# Patient Record
Sex: FEMALE | Race: WHITE | NOT HISPANIC OR LATINO | URBAN - METROPOLITAN AREA
[De-identification: names, ages, dates, MRNs, and addresses within clinical notes are randomized per-mention and may not be internally consistent; named-entity substitution may affect disease eponyms.]

---

## 2022-01-19 ENCOUNTER — INPATIENT (INPATIENT)
Facility: HOSPITAL | Age: 77
LOS: 1 days | Discharge: HOME | DRG: 563 | End: 2022-01-21
Attending: FAMILY MEDICINE | Admitting: HOSPITALIST
Payer: MEDICARE

## 2022-01-19 VITALS
SYSTOLIC BLOOD PRESSURE: 144 MMHG | RESPIRATION RATE: 18 BRPM | DIASTOLIC BLOOD PRESSURE: 69 MMHG | OXYGEN SATURATION: 97 % | TEMPERATURE: 98 F | HEART RATE: 95 BPM | WEIGHT: 125 LBS | HEIGHT: 63 IN

## 2022-01-19 DIAGNOSIS — R55 SYNCOPE AND COLLAPSE: ICD-10-CM

## 2022-01-19 LAB
ALBUMIN SERPL ELPH-MCNC: 3.6 G/DL — SIGNIFICANT CHANGE UP (ref 3.3–5)
ALP SERPL-CCNC: 70 U/L — SIGNIFICANT CHANGE UP (ref 40–120)
ALT FLD-CCNC: 31 U/L — SIGNIFICANT CHANGE UP (ref 12–78)
ANION GAP SERPL CALC-SCNC: 6 MMOL/L — SIGNIFICANT CHANGE UP (ref 5–17)
APPEARANCE UR: CLEAR — SIGNIFICANT CHANGE UP
APTT BLD: 27.8 SEC — SIGNIFICANT CHANGE UP (ref 27.5–35.5)
AST SERPL-CCNC: 24 U/L — SIGNIFICANT CHANGE UP (ref 15–37)
BASOPHILS # BLD AUTO: 0 K/UL — SIGNIFICANT CHANGE UP (ref 0–0.2)
BASOPHILS NFR BLD AUTO: 0 % — SIGNIFICANT CHANGE UP (ref 0–2)
BILIRUB SERPL-MCNC: 1.7 MG/DL — HIGH (ref 0.2–1.2)
BILIRUB UR-MCNC: NEGATIVE — SIGNIFICANT CHANGE UP
BUN SERPL-MCNC: 17 MG/DL — SIGNIFICANT CHANGE UP (ref 7–23)
CALCIUM SERPL-MCNC: 9.1 MG/DL — SIGNIFICANT CHANGE UP (ref 8.5–10.1)
CHLORIDE SERPL-SCNC: 106 MMOL/L — SIGNIFICANT CHANGE UP (ref 96–108)
CK SERPL-CCNC: 224 U/L — HIGH (ref 26–192)
CO2 SERPL-SCNC: 27 MMOL/L — SIGNIFICANT CHANGE UP (ref 22–31)
COLOR SPEC: YELLOW — SIGNIFICANT CHANGE UP
CREAT SERPL-MCNC: 1.07 MG/DL — SIGNIFICANT CHANGE UP (ref 0.5–1.3)
DIFF PNL FLD: ABNORMAL
EOSINOPHIL # BLD AUTO: 0 K/UL — SIGNIFICANT CHANGE UP (ref 0–0.5)
EOSINOPHIL NFR BLD AUTO: 0 % — SIGNIFICANT CHANGE UP (ref 0–6)
GLUCOSE SERPL-MCNC: 165 MG/DL — HIGH (ref 70–99)
GLUCOSE UR QL: NEGATIVE — SIGNIFICANT CHANGE UP
HCT VFR BLD CALC: 43.5 % — SIGNIFICANT CHANGE UP (ref 34.5–45)
HGB BLD-MCNC: 14.2 G/DL — SIGNIFICANT CHANGE UP (ref 11.5–15.5)
INR BLD: 1.07 RATIO — SIGNIFICANT CHANGE UP (ref 0.88–1.16)
KETONES UR-MCNC: NEGATIVE — SIGNIFICANT CHANGE UP
LEUKOCYTE ESTERASE UR-ACNC: NEGATIVE — SIGNIFICANT CHANGE UP
LYMPHOCYTES # BLD AUTO: 0.32 K/UL — LOW (ref 1–3.3)
LYMPHOCYTES # BLD AUTO: 4 % — LOW (ref 13–44)
MCHC RBC-ENTMCNC: 31.8 PG — SIGNIFICANT CHANGE UP (ref 27–34)
MCHC RBC-ENTMCNC: 32.6 GM/DL — SIGNIFICANT CHANGE UP (ref 32–36)
MCV RBC AUTO: 97.5 FL — SIGNIFICANT CHANGE UP (ref 80–100)
MONOCYTES # BLD AUTO: 0.16 K/UL — SIGNIFICANT CHANGE UP (ref 0–0.9)
MONOCYTES NFR BLD AUTO: 2 % — SIGNIFICANT CHANGE UP (ref 2–14)
NEUTROPHILS # BLD AUTO: 7.6 K/UL — HIGH (ref 1.8–7.4)
NEUTROPHILS NFR BLD AUTO: 94 % — HIGH (ref 43–77)
NITRITE UR-MCNC: NEGATIVE — SIGNIFICANT CHANGE UP
NRBC # BLD: SIGNIFICANT CHANGE UP /100 WBCS (ref 0–0)
PH UR: 7 — SIGNIFICANT CHANGE UP (ref 5–8)
PLATELET # BLD AUTO: 181 K/UL — SIGNIFICANT CHANGE UP (ref 150–400)
POTASSIUM SERPL-MCNC: 4.1 MMOL/L — SIGNIFICANT CHANGE UP (ref 3.5–5.3)
POTASSIUM SERPL-SCNC: 4.1 MMOL/L — SIGNIFICANT CHANGE UP (ref 3.5–5.3)
PROT SERPL-MCNC: 7.1 GM/DL — SIGNIFICANT CHANGE UP (ref 6–8.3)
PROT UR-MCNC: NEGATIVE — SIGNIFICANT CHANGE UP
PROTHROM AB SERPL-ACNC: 12.5 SEC — SIGNIFICANT CHANGE UP (ref 10.6–13.6)
RBC # BLD: 4.46 M/UL — SIGNIFICANT CHANGE UP (ref 3.8–5.2)
RBC # FLD: 13.6 % — SIGNIFICANT CHANGE UP (ref 10.3–14.5)
SARS-COV-2 RNA SPEC QL NAA+PROBE: SIGNIFICANT CHANGE UP
SODIUM SERPL-SCNC: 139 MMOL/L — SIGNIFICANT CHANGE UP (ref 135–145)
SP GR SPEC: 1.01 — SIGNIFICANT CHANGE UP (ref 1.01–1.02)
TROPONIN I, HIGH SENSITIVITY RESULT: 3.8 NG/L — SIGNIFICANT CHANGE UP
TROPONIN I, HIGH SENSITIVITY RESULT: 9.43 NG/L — SIGNIFICANT CHANGE UP
UROBILINOGEN FLD QL: NEGATIVE — SIGNIFICANT CHANGE UP
WBC # BLD: 8.09 K/UL — SIGNIFICANT CHANGE UP (ref 3.8–10.5)
WBC # FLD AUTO: 8.09 K/UL — SIGNIFICANT CHANGE UP (ref 3.8–10.5)

## 2022-01-19 PROCEDURE — 73030 X-RAY EXAM OF SHOULDER: CPT | Mod: 26,LT

## 2022-01-19 PROCEDURE — 74177 CT ABD & PELVIS W/CONTRAST: CPT | Mod: 26,MA

## 2022-01-19 PROCEDURE — 84443 ASSAY THYROID STIM HORMONE: CPT

## 2022-01-19 PROCEDURE — 97161 PT EVAL LOW COMPLEX 20 MIN: CPT | Mod: GP

## 2022-01-19 PROCEDURE — 80048 BASIC METABOLIC PNL TOTAL CA: CPT

## 2022-01-19 PROCEDURE — 99222 1ST HOSP IP/OBS MODERATE 55: CPT

## 2022-01-19 PROCEDURE — 82607 VITAMIN B-12: CPT

## 2022-01-19 PROCEDURE — 71260 CT THORAX DX C+: CPT | Mod: 26,MA

## 2022-01-19 PROCEDURE — 83735 ASSAY OF MAGNESIUM: CPT

## 2022-01-19 PROCEDURE — 76856 US EXAM PELVIC COMPLETE: CPT

## 2022-01-19 PROCEDURE — 97116 GAIT TRAINING THERAPY: CPT | Mod: GP

## 2022-01-19 PROCEDURE — 70450 CT HEAD/BRAIN W/O DYE: CPT | Mod: 26,MA

## 2022-01-19 PROCEDURE — 93010 ELECTROCARDIOGRAM REPORT: CPT

## 2022-01-19 PROCEDURE — 83036 HEMOGLOBIN GLYCOSYLATED A1C: CPT

## 2022-01-19 PROCEDURE — 73060 X-RAY EXAM OF HUMERUS: CPT | Mod: 26,LT

## 2022-01-19 PROCEDURE — 36415 COLL VENOUS BLD VENIPUNCTURE: CPT

## 2022-01-19 PROCEDURE — 84484 ASSAY OF TROPONIN QUANT: CPT

## 2022-01-19 PROCEDURE — 93306 TTE W/DOPPLER COMPLETE: CPT

## 2022-01-19 PROCEDURE — 93005 ELECTROCARDIOGRAM TRACING: CPT

## 2022-01-19 PROCEDURE — 93880 EXTRACRANIAL BILAT STUDY: CPT

## 2022-01-19 PROCEDURE — 72125 CT NECK SPINE W/O DYE: CPT | Mod: 26,MA

## 2022-01-19 PROCEDURE — 99285 EMERGENCY DEPT VISIT HI MDM: CPT | Mod: FS

## 2022-01-19 PROCEDURE — 80061 LIPID PANEL: CPT

## 2022-01-19 PROCEDURE — 84100 ASSAY OF PHOSPHORUS: CPT

## 2022-01-19 PROCEDURE — 85027 COMPLETE CBC AUTOMATED: CPT

## 2022-01-19 RX ORDER — PREGABALIN 225 MG/1
1000 CAPSULE ORAL DAILY
Refills: 0 | Status: DISCONTINUED | OUTPATIENT
Start: 2022-01-19 | End: 2022-01-21

## 2022-01-19 RX ORDER — INFLUENZA VIRUS VACCINE 15; 15; 15; 15 UG/.5ML; UG/.5ML; UG/.5ML; UG/.5ML
0.7 SUSPENSION INTRAMUSCULAR ONCE
Refills: 0 | Status: COMPLETED | OUTPATIENT
Start: 2022-01-19 | End: 2022-01-19

## 2022-01-19 RX ORDER — HEPARIN SODIUM 5000 [USP'U]/ML
5000 INJECTION INTRAVENOUS; SUBCUTANEOUS EVERY 8 HOURS
Refills: 0 | Status: DISCONTINUED | OUTPATIENT
Start: 2022-01-19 | End: 2022-01-21

## 2022-01-19 RX ORDER — LANOLIN ALCOHOL/MO/W.PET/CERES
3 CREAM (GRAM) TOPICAL AT BEDTIME
Refills: 0 | Status: DISCONTINUED | OUTPATIENT
Start: 2022-01-19 | End: 2022-01-21

## 2022-01-19 RX ORDER — SODIUM CHLORIDE 9 MG/ML
1000 INJECTION INTRAMUSCULAR; INTRAVENOUS; SUBCUTANEOUS ONCE
Refills: 0 | Status: COMPLETED | OUTPATIENT
Start: 2022-01-19 | End: 2022-01-19

## 2022-01-19 RX ORDER — ACETAMINOPHEN 500 MG
650 TABLET ORAL EVERY 6 HOURS
Refills: 0 | Status: DISCONTINUED | OUTPATIENT
Start: 2022-01-19 | End: 2022-01-21

## 2022-01-19 RX ADMIN — SODIUM CHLORIDE 1000 MILLILITER(S): 9 INJECTION INTRAMUSCULAR; INTRAVENOUS; SUBCUTANEOUS at 14:20

## 2022-01-19 NOTE — ED ADULT NURSE NOTE - OBJECTIVE STATEMENT
Syncope this morning fell in the bathroom, dizziness, hx of Covid shot yesterday .  Left arm and rib pain after fall.

## 2022-01-19 NOTE — ED ADULT TRIAGE NOTE - CHIEF COMPLAINT QUOTE
Patient presents in ED s/p syncopal episode this morning in shower and fell. Patient complains of left arm and left side pain. Patient did hit her head. Patient is not on blood thinners. Patient states " I have felt dizzy since I received my Covid Booster yesterday". Patient sent from Urgent Care.

## 2022-01-19 NOTE — ED STATDOCS - CLINICAL SUMMARY MEDICAL DECISION MAKING FREE TEXT BOX
76 year old female presents with s/p syncopal episode, dizziness, fall. Plan: EKG. CT scans. Labs. X-rays. Reeval.

## 2022-01-19 NOTE — ED ADULT NURSE NOTE - CHIEF COMPLAINT QUOTE
Patient presents in ED s/p syncopal episode this morning in shower and fell. Patient complains of left arm and left side pain. Patient did hit her head. Patient is not on blood thinners. Patient states " I have felt dizzy since I received my Covid Booster yesterday". Patient sent from Urgent Care.
contact guard

## 2022-01-19 NOTE — H&P ADULT - ASSESSMENT
R hand dominant 76 year old female who presetned w syncope and L shoulder pain      #Syncope  #Dizziness after 3rd Moderna vaccine  CT head and Csoine no acute changes  CTA c/abd/pelvis no acute changes + mild hepatomegaly    EKG was NSR 99 w increased /477 and poss lateral wall ischemia  nothing to compare it with  1. admit to telemetry  2. serial trop  3. carotid dopplers  4. echo  5. s/p NS 1000 cc in ED  6. check for orthostatics  7. cardiology consult  8. lipid panel  9. Hb A1c      #Acute comminuted L distal clavicle fx  #L chest wall contusion  #s/p syncope and fall  1. ortho consult read and appreciated  2. ice x 30 min q 4 hrs  3. acetaminophen prn      #VTE  IMPROVE VTE Individual Risk Assessment    RISK                                                                Points    [  ] Previous VTE                                                  3    [  ] Thrombophilia                                               2    [  ] Lower limb paralysis                                      2        (unable to hold up >15 seconds)      [  ] Current Cancer                                              2         (within 6 months)    [  ] Immobilization > 24 hrs                                1    [  ] ICU/CCU stay > 24 hours                              1    [ X ] Age > 60                                                      1    IMPROVE VTE Score _____1____    IMPROVE Score 0-1: Low Risk, No VTE prophylaxis required for most patients, encourage ambulation.   IMPROVE Score 2-3: At risk, pharmacologic VTE prophylaxis is indicated for most patients (in the absence of a contraindication)  IMPROVE Score > or = 4: High Risk, pharmacologic VTE prophylaxis is indicated for most patients (in the absence of a contraindication)

## 2022-01-19 NOTE — PATIENT PROFILE ADULT - FALL HARM RISK - HARM RISK INTERVENTIONS

## 2022-01-19 NOTE — ED STATDOCS - ATTENDING CONTRIBUTION TO CARE
I, Arlyn Tate DO,  performed the initial face to face bedside interview with this patient regarding history of present illness, review of symptoms and relevant past medical, social and family history.  I completed an independent physical examination.  I was the initial provider who evaluated this patient.   I personally saw the patient and performed a substantive portion of the visit including all aspects of the medical decision making.  I have signed out the follow up of any pending tests (i.e. labs, radiological studies) to the ACP.  I have communicated the patient’s plan of care and disposition with the ACP.  The history, relevant review of systems, past medical and surgical history, medical decision making, and physical examination was documented by the scribe in my presence and I attest to the accuracy of the documentation.

## 2022-01-19 NOTE — CONSULT NOTE ADULT - ASSESSMENT
A/P: 76F w/ L clavicle fx    Case discussed with Dr Mckeon  Imaging demonstrating comm L distal 1/3 clavicle fx  Analgesia prn  NWB LUE in sling  DVT ppx  PT/OT as tolerated  Ice as tolerated  No acute orthopedic surgical intervention indicated at this time  Orthopedically stable for outpatient FU w/ Dr Mckeon in the office   Discussed with attending who is in agreement with above plan A/P: 76F w/ L clavicle fx    Case discussed with Dr Mckeon  Imaging demonstrating comm L distal 1/3 clavicle fx  Analgesia prn  NWB LUE in sling  DVT ppx  PT/OT as tolerated  Ice as tolerated  No acute orthopedic surgical intervention indicated at this time  Orthopedically stable for outpatient FU w/ Dr Mckeon in the office 1/24/22  Discussed with attending who is in agreement with above plan

## 2022-01-19 NOTE — ED STATDOCS - PROGRESS NOTE DETAILS
signed Shani uGerin PA-C Pt seen initially in intake by Dr Tate.   76F stood up this morning and felt dizzy, syncopized and fell onto left side. +fx of left clavicle. Pt denies PMH but has not seen a doctor in many years. Pt had covid booster yesterday. pt alert, NAD. No significant findings on labwork. EKG with TWI leads II and III. Will admit for syncope of possible cardiac etiology, pt without any outpt f/u. Pt agrees with admission and plan of care. Case discussed with and admission accepted by hospitalist Dr. Hickey. signed Shani Guerin PA-C   I spoke to Dr Mckeon, ortho will see pt inpt in consult. Pt placed in sling in ED by me.

## 2022-01-19 NOTE — H&P ADULT - HISTORY OF PRESENT ILLNESS
R hand dominant 76 year old female w no PMHx came to ED after syncope w L shoulder pain    She received her 3rd Moderna vaccine yesterday.  Then c/o some dizziness  Denied vertigo  Denied chest pain ot SOB  This morning she got up from bed to go to the bathroom and had syncope R hand dominant 76 year old female w no PMHx came to ED after syncope w L shoulder pain    She received her 3rd Moderna vaccine yesterday.  Then c/o some dizziness "like I was swimming"  Denied vertigo  Denied chest pain ot SOB  This morning she got up from bed to go to the bathroom and had syncope  Hit head, L shoulder  c/o pain to L shoulder and arm and rib cage  pain increases w movement 8/10      In ED /69   HR 95   RR 18   T 97.9   97% sat RA  Head CT no acute changes  CT Cspine no fx  CT chest/abd/pelvis +L clavicle fx - comminuted  Ortho consulted  Pt in sling      PAST MEDICAL HX  denied        PAST SURGICAL HX  denied      FAMILY HX  +heart attack age 80s;  age 89  +Nocardia in brain: father  denied FHX cancer, Diabetes, Hypertension, syncope      SOCIAL 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  Then c/o some dizziness "like I was swimming"  Denied vertigo  Denied chest pain ot SOB  This morning she got up from bed to go to the bathroom and had syncope  Hit head, L shoulder  c/o pain to L shoulder and arm and rib cage  pain increases w movement 8/10      In ED /69   HR 95   RR 18   T 97.9   97% sat RA  Head CT no acute changes  CT Cspine no fx  CT chest/abd/pelvis +L clavicle fx - comminuted  Ortho consulted  Pt in sling  NS 1000 cc    PAST MEDICAL HX  denied        PAST SURGICAL HX  denied      FAMILY HX  +heart attack age 80s;  age 89  +Nocardia in brain: father  denied FHX cancer, Diabetes, Hypertension, syncope      SOCIAL HX  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

## 2022-01-19 NOTE — H&P ADULT - NSHPPHYSICALEXAM_GEN_ALL_CORE
Vital Signs Last 24 Hrs  T(C): 36.7 (19 Jan 2022 22:42), Max: 36.7 (19 Jan 2022 22:42)  T(F): 98.1 (19 Jan 2022 22:42), Max: 98.1 (19 Jan 2022 22:42)  HR: 86 (19 Jan 2022 22:42) (86 - 95)  BP: 128/72 (19 Jan 2022 22:42) (126/66 - 144/69)  BP(mean): 84 (19 Jan 2022 21:29) (84 - 89)  RR: 18 (19 Jan 2022 22:42) (17 - 18)  SpO2: 96% (19 Jan 2022 22:42) (96% - 98%)

## 2022-01-19 NOTE — ED STATDOCS - OBJECTIVE STATEMENT
76 year old female with no pertinent PMHx presents to the ED s/p syncopal episode this morning while trying to get out of bed and fell onto left side. Patient complains of  left shoulder pain. Patient did hit her head. Pt states that she woke up today with room spinning dizziness and felt "woozy". Denies feeling lightheaded or dizzy currently. No prior similar episodes. Pt had booster yesterday, felt fine other than some chills. Does not go to doctor for regular physical, last doctor visit 7-8 years ago. Regular PO intake. Not on any blood thinners. No other injuries or complaints.

## 2022-01-19 NOTE — ED STATDOCS - MUSCULOSKELETAL, MLM
TTP left shoulder, decreased ROM left shoulder secondary to pain, left anterior lateral chest wall pain

## 2022-01-19 NOTE — ED ADULT NURSE NOTE - HOW OFTEN DO YOU HAVE SIX OR MORE DRINKS ON ONE OCCASION?
PT REPORTS THAT HIS LOWER BACK IS HURTING - PT REPORTS THAT USUALLY ONCE A YEAR HIS BACK \"GOES OUT\" - PT REPORTS PAIN X1 WEEK - NO URINARY SYMPTOMS - PT REPORTS THAT IT HURTS TO STAND UP OR BEND - PT DENIES ANY IN JURIES - PT AMBULATORY
Never

## 2022-01-19 NOTE — H&P ADULT - NSHPLABSRESULTS_GEN_ALL_CORE
PROCEDURE:  CT of the Chest, Abdomen and Pelvis was performed.  Imaging was performed through the chest in the arterial phase followed by   imaging of the abdomen and pelvis in the portal venous phase.  Sagittal and coronal reformats were performed.    FINDINGS:  CHEST:  LUNGS AND LARGE AIRWAYS: Patent central airways. No pulmonary nodules.  PLEURA: No pleural effusion.  VESSELS: Within normal limits.  HEART: Heart size is normal. No pericardial effusion.  MEDIASTINUM AND ANN-MARIE: No lymphadenopathy.  CHEST WALL AND LOWER NECK: Within normal limits.    ABDOMEN AND PELVIS:  LIVER: Mild hepatomegaly. Less than 1 cm low-attenuation lesion in   segment 2 on series 2 image 71 which is too small to characterize.  BILE DUCTS: Normal caliber.  GALLBLADDER: Within normal limits.  SPLEEN: Within normal limits.  PANCREAS: Within normal limits.  ADRENALS: Within normal limits.  KIDNEYS/URETERS: Less than 1 cm low-attenuation lesion in the upper pole   of the left kidney which is too small to characterize on CT scan.    BLADDER: Within normal limits.  REPRODUCTIVE ORGANS: Right adnexal cyst measuring 5.0 cm with a smaller   cyst posteriorly measuring 2.0 cm. Please correlate with pelvic   ultrasound if this has not previously been evaluated.    BOWEL: No bowel obstruction. Appendix not clearly visualized. No evidence   for inflammation the right lower quadrant.  PERITONEUM: No ascites.  VESSELS: Duplicated IVC.  RETROPERITONEUM/LYMPH NODES: No lymphadenopathy.  ABDOMINAL WALL: Small umbilical hernia containing fat.  BONES: Comminuted fracture of the left lateral clavicle.    IMPRESSION:  Comminuted fracture of the left lateral clavicle. No evidence for acute   intrathoracic or abdominal injury visualized.    5.0 cm right adnexal cyst. Recommend further evaluation with pelvic   ultrasound if this has not previously been evaluated.    Mild hepatomegaly.  --- End of Report ---

## 2022-01-19 NOTE — PHARMACOTHERAPY INTERVENTION NOTE - COMMENTS
Medication reconciliation completed.  Reviewed Medication list and confirmed med allergies with patient; confirmed with Dr. [facility] Med[facility].  Pt confirms that she does not take any prescription medication at home.  Pt confirms that she got the 3rd Moderna COVID shot yesterday, 01-.

## 2022-01-19 NOTE — H&P ADULT - RS GEN PE MLT RESP DETAILS PC
breath sounds equal/respirations non-labored/clear to auscultation bilaterally/no intercostal retractions

## 2022-01-20 VITALS
OXYGEN SATURATION: 96 % | RESPIRATION RATE: 18 BRPM | DIASTOLIC BLOOD PRESSURE: 65 MMHG | SYSTOLIC BLOOD PRESSURE: 138 MMHG | TEMPERATURE: 99 F | HEART RATE: 90 BPM

## 2022-01-20 LAB
A1C WITH ESTIMATED AVERAGE GLUCOSE RESULT: 5.5 % — SIGNIFICANT CHANGE UP (ref 4–5.6)
ANION GAP SERPL CALC-SCNC: 3 MMOL/L — LOW (ref 5–17)
BUN SERPL-MCNC: 11 MG/DL — SIGNIFICANT CHANGE UP (ref 7–23)
CALCIUM SERPL-MCNC: 8.8 MG/DL — SIGNIFICANT CHANGE UP (ref 8.5–10.1)
CHLORIDE SERPL-SCNC: 110 MMOL/L — HIGH (ref 96–108)
CHOLEST SERPL-MCNC: 170 MG/DL — SIGNIFICANT CHANGE UP
CO2 SERPL-SCNC: 29 MMOL/L — SIGNIFICANT CHANGE UP (ref 22–31)
CREAT SERPL-MCNC: 0.75 MG/DL — SIGNIFICANT CHANGE UP (ref 0.5–1.3)
CULTURE RESULTS: SIGNIFICANT CHANGE UP
ESTIMATED AVERAGE GLUCOSE: 111 MG/DL — SIGNIFICANT CHANGE UP (ref 68–114)
GLUCOSE SERPL-MCNC: 103 MG/DL — HIGH (ref 70–99)
HCT VFR BLD CALC: 38.1 % — SIGNIFICANT CHANGE UP (ref 34.5–45)
HDLC SERPL-MCNC: 91 MG/DL — SIGNIFICANT CHANGE UP
HGB BLD-MCNC: 12.5 G/DL — SIGNIFICANT CHANGE UP (ref 11.5–15.5)
LIPID PNL WITH DIRECT LDL SERPL: 71 MG/DL — SIGNIFICANT CHANGE UP
MAGNESIUM SERPL-MCNC: 2.5 MG/DL — SIGNIFICANT CHANGE UP (ref 1.6–2.6)
MCHC RBC-ENTMCNC: 31.6 PG — SIGNIFICANT CHANGE UP (ref 27–34)
MCHC RBC-ENTMCNC: 32.8 GM/DL — SIGNIFICANT CHANGE UP (ref 32–36)
MCV RBC AUTO: 96.5 FL — SIGNIFICANT CHANGE UP (ref 80–100)
NON HDL CHOLESTEROL: 79 MG/DL — SIGNIFICANT CHANGE UP
PLATELET # BLD AUTO: 144 K/UL — LOW (ref 150–400)
POTASSIUM SERPL-MCNC: 4 MMOL/L — SIGNIFICANT CHANGE UP (ref 3.5–5.3)
POTASSIUM SERPL-SCNC: 4 MMOL/L — SIGNIFICANT CHANGE UP (ref 3.5–5.3)
RBC # BLD: 3.95 M/UL — SIGNIFICANT CHANGE UP (ref 3.8–5.2)
RBC # FLD: 13.6 % — SIGNIFICANT CHANGE UP (ref 10.3–14.5)
SODIUM SERPL-SCNC: 142 MMOL/L — SIGNIFICANT CHANGE UP (ref 135–145)
SPECIMEN SOURCE: SIGNIFICANT CHANGE UP
TRIGL SERPL-MCNC: 39 MG/DL — SIGNIFICANT CHANGE UP
TROPONIN I, HIGH SENSITIVITY RESULT: 11.51 NG/L — SIGNIFICANT CHANGE UP
VIT B12 SERPL-MCNC: 964 PG/ML — SIGNIFICANT CHANGE UP (ref 232–1245)
WBC # BLD: 5.25 K/UL — SIGNIFICANT CHANGE UP (ref 3.8–10.5)
WBC # FLD AUTO: 5.25 K/UL — SIGNIFICANT CHANGE UP (ref 3.8–10.5)

## 2022-01-20 PROCEDURE — 93880 EXTRACRANIAL BILAT STUDY: CPT | Mod: 26

## 2022-01-20 PROCEDURE — 93306 TTE W/DOPPLER COMPLETE: CPT | Mod: 26

## 2022-01-20 PROCEDURE — 76856 US EXAM PELVIC COMPLETE: CPT | Mod: 26

## 2022-01-20 PROCEDURE — 99232 SBSQ HOSP IP/OBS MODERATE 35: CPT

## 2022-01-20 RX ADMIN — Medication 1 TABLET(S): at 09:42

## 2022-01-20 RX ADMIN — Medication 1 TABLET(S): at 09:41

## 2022-01-20 RX ADMIN — PREGABALIN 1000 MICROGRAM(S): 225 CAPSULE ORAL at 09:41

## 2022-01-20 NOTE — PHYSICAL THERAPY INITIAL EVALUATION ADULT - ACTIVE RANGE OF MOTION EXAMINATION, REHAB EVAL
LUE NT except hand/wrist WFL/Right UE Active ROM was WFL (within functional limits)/bilateral  lower extremity Active ROM was WFL (within functional limits)

## 2022-01-20 NOTE — DIETITIAN INITIAL EVALUATION ADULT. - PERTINENT LABORATORY DATA
01-20    142  |  110<H>  |  11  ----------------------------<  103<H>  4.0   |  29  |  0.75    Ca    8.8      20 Jan 2022 07:12  Mg     2.5     01-20    TPro  7.1  /  Alb  3.6  /  TBili  1.7<H>  /  DBili  x   /  AST  24  /  ALT  31  /  AlkPhos  70  01-19

## 2022-01-20 NOTE — PHYSICAL THERAPY INITIAL EVALUATION ADULT - PERTINENT HX OF CURRENT PROBLEM, REHAB EVAL
She received her 3rd Moderna vaccine yesterday w/ subsequent dizziness -"felt like I was swimming". upon oob this am to go to [place] had Syncopal episode. pt c/o L shld pain. XR (+) L distal clavicle fx.

## 2022-01-20 NOTE — DIETITIAN INITIAL EVALUATION ADULT. - ORAL INTAKE PTA/DIET HISTORY
Pt reports very good appetite with little to no changes over the past week or months. Per pt her weight has been stable and she is usually about 100lbs (currently 103). Pt states the highest weight she gets to is about 106lbs and she usually starts working out at the time. Overall pt states she is at her normal and usual body weight

## 2022-01-20 NOTE — DIETITIAN INITIAL EVALUATION ADULT. - OTHER INFO
R hand dominant 76 year old female w no PMHx came to ED after syncope w L shoulder pain She received her 3rd Moderna vaccine yesterday. Then c/o some dizziness "like I was swimming"  Denied vertigo Denied chest pain ot SOB This morning she got up from bed to go to the bathroom and had syncope Hit head, L shoulder c/o pain to L shoulder and arm and rib cage pain increases w movement 8/10.    Pt seen for assessment 2/2 low BMI and age. Pt states this is a normal body weight for her and she has had no issues with eating. Pt states overall she is a good eater and has been this weight for most of her life. Pt denies c/s difficulty and pt denies n/v/d/c. no noted food allergies. Pt denies any history of weight loss and feels she is wel nourished. Pt does not present with overt signs of muscle and fat wasting. RD will follow as needed.

## 2022-01-20 NOTE — PROGRESS NOTE ADULT - SUBJECTIVE AND OBJECTIVE BOX
Chief Complaint:    Interval History:    ROS: Multi system review is comprehensively negative x 10 systems except as above    Physical Exam:  T(F): 98 (20 Jan 2022 07:57), Max: 98.1 (19 Jan 2022 22:42)  HR: 86 (19 Jan 2022 22:42) (86 - 88)  BP: 128/72 (19 Jan 2022 22:42) (126/66 - 128/72)  BP(mean): 84 (19 Jan 2022 21:29) (84 - 84)  RR: 18 (19 Jan 2022 22:42) (17 - 18)  SpO2: 95% (20 Jan 2022 07:57) (95% - 98%)    Gen:  HEENT:  Neck:  Chest:  CVS:  Abd:  Ext:  Skin:  Neuro:  Mood:    Labs:    Micro:    Imaging:    Cardiac Testing:    Meds: Chief Complaint: Syncope, fall, L shoulder pain    Interval History: Patient seen and examined. Patient states that she feels generally well. Has some mild L shoulder discomfort but no other complaints. Denies chest pain or tightness. Denies cough, dyspnea, orthopnea, PND, LE swelling. No lightheadedness or dizziness. No abdominal complaints or genitourinary complaints. Underwent carotid duplex and TTE today. Had PT evaluation as well, recommended outpatient PT.     ROS: Multi system review is comprehensively negative x 10 systems except as above    Physical Exam:  T(F): 98 (2022 07:57), Max: 98.1 (2022 22:42)  HR: 86 (2022 22:42) (86 - 88)  BP: 128/72 (2022 22:42) (126/66 - 128/72)  BP(mean): 84 (2022 21:29) (84 - 84)  RR: 18 (2022 22:42) (17 - 18)  SpO2: 95% (2022 07:57) (95% - 98%)    Gen: Comfortable appearing  HEENT: NCAT PERRL EOMI no oral lesions  Neck: Supple, no C spine tenderness  Chest: CTA B/L  CVS: S1 S2 normal RRR  Abd: +BS Soft NT ND   Ext: Tenderness over L clavicle. Neurovascularly intact LUE. Soft compartments.   Skin: Warm, dry  Neuro: Awake and alert, oriented to person, place and time, no focal deficits  Mood: Calm, pleasant    Labs:    CBC                         12.5   5.25  )---------( 144             38.1     BMP     142  |  110  |  11  ---------------------< 103  4.0   |   29   |  0.75    Ca 8.8  Mg 2.5     LFTs :  TPro  7.1  /  Alb  3.6  /  TBili  1.7  /  DBili  x   /  AST  24  /  ALT  31  /  AlkPhos  70      Coags : PT: 12.5 sec;   INR: 1.07 ratio;     PTT:27.8 sec    Trop (-) x 3      Urinalysis   Color: Yellow / Appearance: Clear / S.010 / pH: x  Gluc: x / Ketone: Negative  / Bili: Negative / Urobili: Negative   Blood: x / Protein: Negative / Nitrite: Negative   Leuk Esterase: Negative / RBC: 3-5 /HPF / WBC Negative   Sq Epi: x / Non Sq Epi: Occasional / Bacteria: Negative    Micro:  COVID19 PCR : Negative    Imaging:  Pelvis US : Right adnexal cysts measuring 4.9 cm and 2.9 cm. Left ovarian cyst measuring 1.3 cm. Hyperechoic focus in the endometrium measuring 8 mm; differential includes a polyp, blood clot or neoplasm    Carotid duplex : No hemodynamically significant stenosis    CT abdomen/pelvis W/ : Mild hepatomegaly. Less than 1cm low-attenuation lesion in segment 2 on series 2 image 71 which is too small to characterize. Normal bile ducts. Normal gallbladder. Normal spleen, pancreas and adrenals. Less than 1 cm low-attenuation lesion in the upper pole of the left kidney which is too small to characterize on CT scan. Normal bladder. Right adnexal cyst measuring 5.0 cm with a smaller cyst posteriorly measuring 2.0 cm. No bowel obstruction. Appendix not clearly visualized. No evidence for inflammation the right lower quadrant. No ascites. Duplicated IVC. No lymphadenopathy. Small umbilical hernia containing fat.    CT chest W/ : Patent central airways. No pulmonary nodules. No pleural effusion. Vessels WNL. Heart size is normal. No pericardial effusion. No lymphadenopathy. Chest wall and neck within normal limits.    CT c-spine WO : No acute fracture or traumatic subluxation. No prevertebral soft tissue swelling. Vertebral body height and facet alignment are maintained. Straightening of the normal cervical lordosis. Multilevel disc space narrowing. Alignment at the craniocervical junction unremarkable. Multilevel degenerative changes characterized by degenerative disc disease and facet/uncinate hypertrophy. Visualized soft tissues unremarkable.    CT head WO : No hydrocephalus, mass effect, midline shift, acute intracranial hemorrhage, or brain edema. Mild white matter microvascular ischemic disease. No displaced calvarial fracture. Visualized right posterior ethmoid sinus mucosal thickening, otherwise   the visualized paranasal sinuses and mastoid air cells are clear.    L shoulder and humerus XR : The osseous and articular structures of the humerus are intact without fracture or dislocation. Distal L clavicle fracture seen.    Cardiac Testing:  TTE : Normal LV size, wall thickness, wall motion and contractility. LVEF 60-65 %. Normal RV structure and function. Trace MR. Mod to severe TR. Mild p HTN. No evidence of pericardial effusion.     EKG : sinus tachycardia at 99, T wave abnormality, QTc 477.    Meds:  MEDICATIONS  (STANDING):  cyanocobalamin 1000 MICROGram(s) Oral daily  heparin   Injectable 5000 Unit(s) SubCutaneous every 8 hours  influenza  Vaccine (HIGH DOSE) 0.7 milliLiter(s) IntraMuscular once  multivitamin 1 Tablet(s) Oral daily  vitamin B complex with vitamin C 1 Tablet(s) Oral daily    MEDICATIONS  (PRN):  acetaminophen     Tablet .. 650 milliGRAM(s) Oral every 6 hours PRN Temp greater or equal to 38C (100.4F), Mild Pain (1 - 3)  aluminum hydroxide/magnesium hydroxide/simethicone Suspension 30 milliLiter(s) Oral every 4 hours PRN Dyspepsia  melatonin 3 milliGRAM(s) Oral at bedtime PRN Insomnia   Chief Complaint: Syncope, fall, L shoulder pain    Interval History: Patient seen and examined. Patient states that she feels generally well. Has some mild L shoulder discomfort but no other complaints. Denies chest pain or tightness. Denies cough, dyspnea, orthopnea, PND, LE swelling. No lightheadedness or dizziness. No abdominal complaints or genitourinary complaints. Orthopedic Surgery is recommending no surgical intervention at this time, LUE in [place], NWB to that are, outpatient follow up on . PT evaluation done, recommended for outpatient PT. She underwent carotid duplex and TTE today, both rather reassuring. On telemetry monitor though, she had a somewhat lengthy paroxysm of atrial tachycardia during the mid-day. No other issues.     ROS: Multi system review is comprehensively negative x 10 systems except as above    Physical Exam:  T(F): 98 (2022 07:57), Max: 98.1 (2022 22:42)  HR: 86 (2022 22:42) (86 - 88)  BP: 128/72 (2022 22:42) (126/66 - 128/72)  BP(mean): 84 (2022 21:29) (84 - 84)  RR: 18 (2022 22:42) (17 - 18)  SpO2: 95% (2022 07:57) (95% - 98%)    Gen: Comfortable appearing  HEENT: NCAT PERRL EOMI no oral lesions  Neck: Supple, no C spine tenderness  Chest: CTA B/L  CVS: S1 S2 normal RRR  Abd: +BS Soft NT ND   Ext: Tenderness over L clavicle. Neurovascularly intact LUE. Soft compartments.   Skin: Warm, dry  Neuro: Awake and alert, oriented to person, place and time, no focal deficits  Mood: Calm, pleasant    Labs:    CBC                         12.5   5.25  )---------( 144             38.1     BMP     142  |  110  |  11  ---------------------< 103  4.0   |   29   |  0.75    Ca 8.8  Mg 2.5     LFTs :  TPro  7.1  /  Alb  3.6  /  TBili  1.7  /  DBili  x   /  AST  24  /  ALT  31  /  AlkPhos  70      Coags : PT: 12.5 sec;   INR: 1.07 ratio;     PTT:27.8 sec    Trop (-) x 3      Urinalysis   Color: Yellow / Appearance: Clear / S.010 / pH: x  Gluc: x / Ketone: Negative  / Bili: Negative / Urobili: Negative   Blood: x / Protein: Negative / Nitrite: Negative   Leuk Esterase: Negative / RBC: 3-5 /HPF / WBC Negative   Sq Epi: x / Non Sq Epi: Occasional / Bacteria: Negative    Micro:  COVID19 PCR : Negative    Imaging:  Pelvis US : Right adnexal cysts measuring 4.9 cm and 2.9 cm. Left ovarian cyst measuring 1.3 cm. Hyperechoic focus in the endometrium measuring 8 mm; differential includes a polyp, blood clot or neoplasm    Carotid duplex : No hemodynamically significant stenosis    CT abdomen/pelvis W: Mild hepatomegaly. Less than 1cm low-attenuation lesion in segment 2 on series 2 image 71 which is too small to characterize. Normal bile ducts. Normal gallbladder. Normal spleen, pancreas and adrenals. Less than 1 cm low-attenuation lesion in the upper pole of the left kidney which is too small to characterize on CT scan. Normal bladder. Right adnexal cyst measuring 5.0 cm with a smaller cyst posteriorly measuring 2.0 cm. No bowel obstruction. Appendix not clearly visualized. No evidence for inflammation the right lower quadrant. 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 Duplicated IVC. No lymphadenopathy. Small umbilical hernia containing fat.    CT chest W: Patent central airways. No pulmonary nodules. No pleural effusion. Vessels WNL. Heart size is normal. No pericardial effusion. No lymphadenopathy. Chest wall and neck within normal limits.    CT c-spine WO : No acute fracture or traumatic subluxation. No prevertebral soft tissue swelling. Vertebral body height and facet alignment are maintained. Straightening of the normal cervical lordosis. Multilevel disc space narrowing. Alignment at the craniocervical junction unremarkable. Multilevel degenerative changes characterized by degenerative disc disease and facet/uncinate hypertrophy. Visualized soft tissues unremarkable.    CT head WO : No hydrocephalus, mass effect, midline shift, acute intracranial hemorrhage, or brain edema. Mild white matter microvascular ischemic disease. No displaced calvarial fracture. Visualized right posterior ethmoid sinus mucosal thickening, otherwise   the visualized paranasal sinuses and mastoid air cells are clear.    L shoulder and humerus XR : The osseous and articular structures of the humerus are intact without fracture or dislocation. Distal L clavicle fracture seen.    Cardiac Testing:  Tele : Paroxysm of atrial tachycardia, otherwise NSR rate WNL.    TTE : Normal LV size, wall thickness, wall motion and contractility. LVEF 60-65 %. Normal RV structure and function. Trace MR. Mod to severe TR. Mild p HTN. No evidence of pericardial effusion.     EKG : sinus tachycardia at 99, T wave abnormality, QTc 477.    Meds:  MEDICATIONS  (STANDING):  cyanocobalamin 1000 MICROGram(s) Oral daily  heparin   Injectable 5000 Unit(s) SubCutaneous every 8 hours  influenza  Vaccine (HIGH DOSE) 0.7 milliLiter(s) IntraMuscular once  multivitamin 1 Tablet(s) Oral daily  vitamin B complex with vitamin C 1 Tablet(s) Oral daily    MEDICATIONS  (PRN):  acetaminophen     Tablet .. 650 milliGRAM(s) Oral every 6 hours PRN Temp greater or equal to 38C (100.4F), Mild Pain (1 - 3)  aluminum hydroxide/magnesium hydroxide/simethicone Suspension 30 milliLiter(s) Oral every 4 hours PRN Dyspepsia  melatonin 3 milliGRAM(s) Oral at bedtime PRN Insomnia   Chief Complaint: Syncope, fall, L shoulder pain    Interval History: Patient seen and examined. Patient states that she feels generally well. Has some mild L shoulder discomfort but no other complaints. Denies chest pain or tightness. Denies cough, dyspnea, orthopnea, PND, LE swelling. No lightheadedness or dizziness. No abdominal complaints or genitourinary complaints. Orthopedic Surgery is recommending no surgical intervention at this time, LUE in sling, NWB to that arm, outpatient follow up on . PT evaluation done, patient recommended for outpatient PT. She underwent carotid duplex and TTE today as well, both rather reassuring. On telemetry monitor though, she had a somewhat lengthy paroxysm of atrial tachycardia during the mid-day. No other issues.     ROS: Multi system review is comprehensively negative x 10 systems except as above    Physical Exam:  T(F): 98 (2022 07:57), Max: 98.1 (2022 22:42)  HR: 86 (2022 22:42) (86 - 88)  BP: 128/72 (2022 22:42) (126/66 - 128/72)  BP(mean): 84 (2022 21:29) (84 - 84)  RR: 18 (2022 22:42) (17 - 18)  SpO2: 95% (2022 07:57) (95% - 98%)    Gen: Comfortable appearing  HEENT: NCAT PERRL EOMI no oral lesions  Neck: Supple, no C spine tenderness  Chest: CTA B/L  CVS: S1 S2 normal RRR  Abd: +BS Soft NT ND   Ext: Tenderness over L clavicle. Neurovascularly intact LUE. Soft compartments.   Skin: Warm, dry  Neuro: Awake and alert, oriented to person, place and time, no focal deficits  Mood: Calm, pleasant    Labs:    CBC                         12.5   5.25  )---------( 144             38.1     BMP     142  |  110  |  11  ---------------------< 103  4.0   |   29   |  0.75    Ca 8.8  Mg 2.5     LFTs :  TPro  7.1  /  Alb  3.6  /  TBili  1.7  /  DBili  x   /  AST  24  /  ALT  31  /  AlkPhos  70      Coags : PT: 12.5 sec;   INR: 1.07 ratio;     PTT:27.8 sec    Trop (-) x 3      Urinalysis   Color: Yellow / Appearance: Clear / S.010 / pH: x  Gluc: x / Ketone: Negative  / Bili: Negative / Urobili: Negative   Blood: x / Protein: Negative / Nitrite: Negative   Leuk Esterase: Negative / RBC: 3-5 /HPF / WBC Negative   Sq Epi: x / Non Sq Epi: Occasional / Bacteria: Negative    Micro:  COVID19 PCR : Negative    Imaging:  Pelvis US : Right adnexal cysts measuring 4.9 cm and 2.9 cm. Left ovarian cyst measuring 1.3 cm. Hyperechoic focus in the endometrium measuring 8 mm; differential includes a polyp, blood clot or neoplasm    Carotid duplex : No hemodynamically significant stenosis    CT abdomen/pelvis W/ : Mild hepatomegaly. Less than 1cm low-attenuation lesion in segment 2 on series 2 image 71 which is too small to characterize. Normal bile ducts. Normal gallbladder. Normal spleen, pancreas and adrenals. Less than 1 cm low-attenuation lesion in the upper pole of the left kidney which is too small to characterize on CT scan. Normal bladder. Right adnexal cyst measuring 5.0 cm with a smaller cyst posteriorly measuring 2.0 cm. No bowel obstruction. Appendix not clearly visualized. No evidence for inflammation the right lower quadrant. 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 Duplicated IVC. No lymphadenopathy. Small umbilical hernia containing fat.    CT chest W: Patent central airways. No pulmonary nodules. No pleural effusion. Vessels WNL. Heart size is normal. No pericardial effusion. No lymphadenopathy. Chest wall and neck within normal limits.    CT c-spine WO : No acute fracture or traumatic subluxation. No prevertebral soft tissue swelling. Vertebral body height and facet alignment are maintained. Straightening of the normal cervical lordosis. Multilevel disc space narrowing. Alignment at the craniocervical junction unremarkable. Multilevel degenerative changes characterized by degenerative disc disease and facet/uncinate hypertrophy. Visualized soft tissues unremarkable.    CT head WO : No hydrocephalus, mass effect, midline shift, acute intracranial hemorrhage, or brain edema. Mild white matter microvascular ischemic disease. No displaced calvarial fracture. Visualized right posterior ethmoid sinus mucosal thickening, otherwise the visualized paranasal sinuses and mastoid air cells are clear.    L shoulder and humerus XR : The osseous and articular structures of the humerus are intact without fracture or dislocation. Distal L clavicle fracture seen.    Cardiac Testing:  Tele : Paroxysm of atrial tachycardia, otherwise NSR rate WNL.    TTE : Normal LV size, wall thickness, wall motion and contractility. LVEF 60-65 %. Normal RV structure and function. Trace MR. Mod to severe TR. Mild p HTN. No evidence of pericardial effusion.     EKG : sinus tachycardia at 99, T wave abnormality, QTc 477.    Meds:  MEDICATIONS  (STANDING):  cyanocobalamin 1000 MICROGram(s) Oral daily  heparin   Injectable 5000 Unit(s) SubCutaneous every 8 hours  influenza  Vaccine (HIGH DOSE) 0.7 milliLiter(s) IntraMuscular once  multivitamin 1 Tablet(s) Oral daily  vitamin B complex with vitamin C 1 Tablet(s) Oral daily    MEDICATIONS  (PRN):  acetaminophen     Tablet .. 650 milliGRAM(s) Oral every 6 hours PRN Temp greater or equal to 38C (100.4F), Mild Pain (1 - 3)  aluminum hydroxide/magnesium hydroxide/simethicone Suspension 30 milliLiter(s) Oral every 4 hours PRN Dyspepsia  melatonin 3 milliGRAM(s) Oral at bedtime PRN Insomnia

## 2022-01-20 NOTE — DIETITIAN INITIAL EVALUATION ADULT. - PERTINENT MEDS FT
MEDICATIONS  (STANDING):  cyanocobalamin 1000 MICROGram(s) Oral daily  heparin   Injectable 5000 Unit(s) SubCutaneous every 8 hours  influenza  Vaccine (HIGH DOSE) 0.7 milliLiter(s) IntraMuscular once  multivitamin 1 Tablet(s) Oral daily  vitamin B complex with vitamin C 1 Tablet(s) Oral daily    MEDICATIONS  (PRN):  acetaminophen     Tablet .. 650 milliGRAM(s) Oral every 6 hours PRN Temp greater or equal to 38C (100.4F), Mild Pain (1 - 3)  aluminum hydroxide/magnesium hydroxide/simethicone Suspension 30 milliLiter(s) Oral every 4 hours PRN Dyspepsia  melatonin 3 milliGRAM(s) Oral at bedtime PRN Insomnia

## 2022-01-20 NOTE — PROGRESS NOTE ADULT - ASSESSMENT
Diet:  DVT px:  Code status:  Dispo:   76 year old woman with no known PMHx, presented with L shoulder pain after an episode of syncope. Patient was in her usual state of health when she got up to go to the bathroom, felt some dizziness, walked to bathroom and passed out, suffering head strike and L shoulder strike. In the ED she was found to have a comminuted L clavicle fracture, also incidentally found to have a 5cm adnexal cyst. Workup was otherwise rather unrevealing. Neuro exam normal. COVID neg. UA neg. EKG notable for QTc 477, T wave abnormality though troponin neg. CT head negative. CT C spine negative. Patient placed on sling, ordered for DANIELA, telemetry, and admitted to Medicine.     Syncope  Etiology unclear. No signs of infection. No bleeding or hypovolemia otherwise. Neurologically intact. CT head negative. EKG with QTc 477, T wave abnormality but trop negative, no angina or CHF sx. Carotid duplex negative for hemodynamically significant stenosis. TTE witih normal LV size, wall thickness, wall motion and contractility. LVEF 60-65 %. Normal RV structure and function. Trace MR. Mod to severe TR. Mild p HTN. No evidence of pericardial effusion.     L clavicle fracture  Appreciate input from Orthopedic Surgery. Imaging demonstrating comminuted L distal 1/3 clavicle fx. No acute orthopedic surgical intervention indicated at this time  - Analgesia prn  - NWB LUE in sling  - PT/OT as tolerated, seen by PT, recommended outpatient PT  - Ice as tolerated  - Outpatient follow up w/ Dr. Mckeon in the office 1/24/22    Incidental findings of R ovarian cysts 5cm and 3cm, L ovarian cyst 1.3cm and a hyperechoic focus in endometrium  As noted on pelvic US. Differential of the endometrial finding includes a polyp, blood clot or neoplasm.   - Can be further evaluated as outpatient.       Diet: Regular  DVT px: Heparin subcut  Code status: Full  Dispo: Anticipate DC to home when inpatient workup is complete, possibly tomorrow, home with outpatient PT   76 year old woman with no known PMHx, presented with L shoulder pain after an episode of syncope. Patient was in her usual state of health when she got up to go to the bathroom, felt some dizziness, walked to bathroom and passed out, suffering head strike and L shoulder strike. In the ED she was found to have a comminuted L clavicle fracture, also incidentally found to have a 5cm adnexal cyst. Workup was otherwise rather unrevealing. Neuro exam normal. COVID neg. UA neg. EKG notable for QTc 477, T wave abnormality though troponin neg. CT head negative. CT C spine negative. Patient placed on sling, ordered for DANIELA, telemetry, and admitted to Medicine.     Syncope  Etiology unclear. Orthostatics negative. No signs of infection. No bleeding or hypovolemia otherwise. She did however just take a COVID booster the day prior but reports that she thought she was hydrating enough. Neurologically, she was intact. CT head negative. EKG with QTc 477, T wave abnormality but trop negative, no angina or CHF sx. Carotid duplex negative for hemodynamically significant stenosis. TTE witih normal LV size, wall thickness, wall motion and contractility. LVEF 60-65 %. Normal RV structure and function. Trace MR. Mod to severe TR. Mild p HTN. No evidence of pericardial effusion. On tele she had a somewhat lengthy paroxysm of atrial tachycardia during the midday today but may have been asymptomatic from this event.  - Continue on tele overnight to see if any more episodes  - Decision tomorrow re MCOT/Zio patch, outpatient Cardiology follow up etc    L clavicle fracture  Appreciate input from Orthopedic Surgery. Imaging demonstrating comminuted L distal 1/3 clavicle fx. No acute orthopedic surgical intervention indicated at this time  - Analgesia prn  - NWB LUE in sling  - PT/OT as tolerated, seen by PT, recommended outpatient PT  - Ice as tolerated  - Outpatient follow up w/ Dr. Mckeon in the office 1/24/22    Incidental findings of R ovarian cysts 5cm and 3cm, L ovarian cyst 1.3cm and a hyperechoic focus in endometrium  As noted on pelvic US. Differential of the endometrial finding includes a polyp, blood clot or neoplasm.   - Can be further evaluated as outpatient, will need a Gyn referral.       Diet: Regular  DVT px: Heparin subcut  Code status: Full  Dispo: Anticipate DC to home tomorrow if no further arrhythmia, home with outpatient PT. Will  need outpatient follow up with Ortho 1/24, Gyn referral and Primary Care referral as she has neither of those.    76 year-old woman with no known PMHx, does not follow regularly with a health care provider, presented 1/19 with L shoulder pain after an episode of syncope. Patient was in her usual state of health, received COVID booster the day prior, got up to go to the bathroom and felt some dizziness in doing so, walked to bathroom and passed out. Her fall resulted in a head strike and L shoulder strike. In the ED, she was found to have a comminuted L clavicle fracture. Workup was otherwise rather unrevealing. Neuro exam normal. COVID neg. UA neg. EKG notable for QTc 477, T wave abnormality though troponin neg. CT head negative. CT C spine negative. Patient placed in LUE sling, ordered for telemetry, TTE, and admitted to Medicine.     Syncope  Etiology unclear. May have been vaso-vagal. No signs of infection. No bleeding. No obvious hypovolemia. Orthostatics negative. She did however just take a COVID booster the day prior but reports that she thought she was hydrating enough. Neurologically, she was intact. CT head negative. EKG with QTc 477, T wave abnormality but trop negative, no angina or CHF sx. Carotid duplex negative for hemodynamically significant stenosis. TTE witih normal LV size, wall thickness, wall motion and contractility. LVEF 60-65 %. Normal RV structure and function. Trace MR. Mod to severe TR. Mild p HTN. No evidence of pericardial effusion. On tele she had a somewhat lengthy paroxysm of atrial tachycardia during the midday today but may have been asymptomatic from this event.  - Continue on tele overnight to see if any more episodes  - Decision tomorrow re MCOT / Zio patch, outpatient Cardiology follow up etc    L clavicle fracture  Appreciate input from Orthopedic Surgery. Imaging demonstrating comminuted L distal 1/3 clavicle fx. No acute orthopedic surgical intervention indicated at this time  - Analgesia prn  - NWB to LUE, in sling  - PT/OT as tolerated, seen by PT, recommended outpatient PT  - Ice as tolerated  - Outpatient follow up w/ Dr. Mckeon in the office 1/24/22    Incidental findings of R ovarian cysts 5cm and 3cm, L ovarian cyst 1.3cm and a hyperechoic focus in endometrium  As noted on pelvic US. Differential of the endometrial finding includes a polyp, blood clot or neoplasm.   - Can be further evaluated as outpatient, will need a Gyn referral.       Diet: Regular  DVT px: Heparin subcut  Code status: Full  Dispo: Anticipate DC to home tomorrow if no further arrhythmia, home with outpatient PT. Will  need outpatient follow up with Ortho 1/24, Gyn referral and Primary Care referral as she has neither of those.

## 2022-01-21 LAB
ANION GAP SERPL CALC-SCNC: 4 MMOL/L — LOW (ref 5–17)
BUN SERPL-MCNC: 14 MG/DL — SIGNIFICANT CHANGE UP (ref 7–23)
CALCIUM SERPL-MCNC: 9.3 MG/DL — SIGNIFICANT CHANGE UP (ref 8.5–10.1)
CHLORIDE SERPL-SCNC: 107 MMOL/L — SIGNIFICANT CHANGE UP (ref 96–108)
CO2 SERPL-SCNC: 28 MMOL/L — SIGNIFICANT CHANGE UP (ref 22–31)
CREAT SERPL-MCNC: 0.78 MG/DL — SIGNIFICANT CHANGE UP (ref 0.5–1.3)
GLUCOSE SERPL-MCNC: 112 MG/DL — HIGH (ref 70–99)
HCT VFR BLD CALC: 40.1 % — SIGNIFICANT CHANGE UP (ref 34.5–45)
HGB BLD-MCNC: 13.2 G/DL — SIGNIFICANT CHANGE UP (ref 11.5–15.5)
MAGNESIUM SERPL-MCNC: 2.3 MG/DL — SIGNIFICANT CHANGE UP (ref 1.6–2.6)
MCHC RBC-ENTMCNC: 32 PG — SIGNIFICANT CHANGE UP (ref 27–34)
MCHC RBC-ENTMCNC: 32.9 GM/DL — SIGNIFICANT CHANGE UP (ref 32–36)
MCV RBC AUTO: 97.1 FL — SIGNIFICANT CHANGE UP (ref 80–100)
PHOSPHATE SERPL-MCNC: 2.7 MG/DL — SIGNIFICANT CHANGE UP (ref 2.5–4.5)
PLATELET # BLD AUTO: 153 K/UL — SIGNIFICANT CHANGE UP (ref 150–400)
POTASSIUM SERPL-MCNC: 3.8 MMOL/L — SIGNIFICANT CHANGE UP (ref 3.5–5.3)
POTASSIUM SERPL-SCNC: 3.8 MMOL/L — SIGNIFICANT CHANGE UP (ref 3.5–5.3)
RBC # BLD: 4.13 M/UL — SIGNIFICANT CHANGE UP (ref 3.8–5.2)
RBC # FLD: 13.6 % — SIGNIFICANT CHANGE UP (ref 10.3–14.5)
SODIUM SERPL-SCNC: 139 MMOL/L — SIGNIFICANT CHANGE UP (ref 135–145)
TSH SERPL-MCNC: 3.25 UU/ML — SIGNIFICANT CHANGE UP (ref 0.34–4.82)
WBC # BLD: 5.89 K/UL — SIGNIFICANT CHANGE UP (ref 3.8–10.5)
WBC # FLD AUTO: 5.89 K/UL — SIGNIFICANT CHANGE UP (ref 3.8–10.5)

## 2022-01-21 PROCEDURE — 99238 HOSP IP/OBS DSCHRG MGMT 30/<: CPT

## 2022-01-21 PROCEDURE — 93010 ELECTROCARDIOGRAM REPORT: CPT

## 2022-01-21 RX ORDER — PREGABALIN 225 MG/1
1 CAPSULE ORAL
Qty: 0 | Refills: 0 | DISCHARGE

## 2022-01-21 RX ORDER — SODIUM CHLORIDE 0.65 %
2 AEROSOL, SPRAY (ML) NASAL
Qty: 0 | Refills: 0 | DISCHARGE

## 2022-01-21 RX ORDER — INFLUENZA VIRUS VACCINE 15; 15; 15; 15 UG/.5ML; UG/.5ML; UG/.5ML; UG/.5ML
0.7 SUSPENSION INTRAMUSCULAR
Qty: 0 | Refills: 0 | DISCHARGE

## 2022-01-21 RX ORDER — PREGABALIN 225 MG/1
1 CAPSULE ORAL
Qty: 0 | Refills: 0 | DISCHARGE
Start: 2022-01-21

## 2022-01-21 RX ORDER — CX-024414 0.2 MG/ML
0.5 INJECTION, SUSPENSION INTRAMUSCULAR
Qty: 0 | Refills: 0 | DISCHARGE

## 2022-01-21 RX ADMIN — Medication 1 TABLET(S): at 10:39

## 2022-01-21 RX ADMIN — PREGABALIN 1000 MICROGRAM(S): 225 CAPSULE ORAL at 10:39

## 2022-01-21 RX ADMIN — Medication 1 TABLET(S): at 10:38

## 2022-01-21 NOTE — DISCHARGE NOTE NURSING/CASE MANAGEMENT/SOCIAL WORK - NSDCPEFALRISK_GEN_ALL_CORE
For information on Fall & Injury Prevention, visit: [email or web address] OR  [email or web address] OR  https://www.cdc.gov/steadi/patient.html

## 2022-01-21 NOTE — DISCHARGE NOTE PROVIDER - NSDCCPCAREPLAN_GEN_ALL_CORE_FT
PRINCIPAL DISCHARGE DIAGNOSIS  Diagnosis: Syncope  Assessment and Plan of Treatment: Syncope. Etiology unclear.   No signs of infection. No bleeding. No obvious hypovolemia. Orthostatics negative.   You did however just take a COVID booster the day prior but reports that thought you were not hydrating enough.   CT head negative.   Carotid duplex negative for hemodynamically significant stenosis.   TTE witih normal LV size, wall thickness, wall motion and contractility. LVEF 60-65 %. Normal RV structure and function. Trace MR. Mod to severe TR. Mild p HTN. No evidence of pericardial effusion.   On tele she had a somewhat lengthy paroxysm of atrial tachycardia during the midday today but may have been asymptomatic from this event.  Follow up with Dr. Michaels (Cardiologist) after discharge -- to arrnage for outpatient monitor and stress test.      SECONDARY DISCHARGE DIAGNOSES  Diagnosis: Fx clavicle  Assessment and Plan of Treatment: Left  clavicle fracture. Imaging demonstrating comminuted L distal 1/3 clavicle fx. No acute orthopedic surgical intervention indicated at this time. Continue non-weight bearing to left arm, usesling  Follow up outpatient with Dr. Mckeon in the office 1/24/22I    Diagnosis: Abnormal ultrasound  Assessment and Plan of Treatment: Incidental findings of R ovarian cysts 5cm and 3cm, L ovarian cyst 1.3cm and a hyperechoic focus in endometrium  As noted on pelvic US. Differential of the endometrial finding includes a polyp, blood clot or neoplasm.   - Can be further evaluated as outpatient, will need a Gyn referral.

## 2022-01-21 NOTE — DISCHARGE NOTE PROVIDER - CARE PROVIDER_API CALL
Bennett Michaels (DO)  Cardiology  [address] [place] [address]  Phone: [phone]  Fax: [phone]  Follow Up Time:     Arturo Mckeon)  Orthopaedic Surgery; Surgery of the Hand  [address] [place] [address]  Phone: [phone]  Fax: [phone]  Follow Up Time:    Bennett Michaels (DO)  Cardiology  [address] [place] [address]  Phone: [phone]  Fax: [phone]  Follow Up Time:     Arturo Mckeon (MD)  Orthopaedic Surgery; Surgery of the Hand  [address] [place] [address]  Phone: [phone]  Fax: [phone]  Follow Up Time:     Daniel Magdaleno)  Obstetrics and Gynecology  [address] [place] [address]  Phone: [phone]  Fax: [phone]  Follow Up Time:

## 2022-01-21 NOTE — DISCHARGE NOTE PROVIDER - HOSPITAL COURSE
Syncope  Etiology unclear. May have been vaso-vagal. No signs of infection. No bleeding. No obvious hypovolemia. Orthostatics negative. She did however just take a COVID booster the day prior but reports that she thought she was hydrating enough. Neurologically, she was intact. CT head negative. EKG with QTc 477, T wave abnormality but trop negative, no angina or CHF sx. Carotid duplex negative for hemodynamically significant stenosis. TTE witih normal LV size, wall thickness, wall motion and contractility. LVEF 60-65 %. Normal RV structure and function. Trace MR. Mod to severe TR. Mild p HTN. No evidence of pericardial effusion. On tele she had a somewhat lengthy paroxysm of atrial tachycardia during the midday today but may have been asymptomatic from this event.      L clavicle fracture  Appreciate input from Orthopedic Surgery. Imaging demonstrating comminuted L distal 1/3 clavicle fx. No acute orthopedic surgical intervention indicated at this time  - Analgesia prn  - NWB to LUE, in sling  - PT/OT as tolerated, seen by PT, recommended outpatient PT  - Ice as tolerated  - Outpatient follow up w/ Dr. Mckeon in the office 1/24/22I      ncidental findings of R ovarian cysts 5cm and 3cm, L ovarian cyst 1.3cm and a hyperechoic focus in endometrium  As noted on pelvic US. Differential of the endometrial finding includes a polyp, blood clot or neoplasm.   - Can be further evaluated as outpatient, will need a Gyn referral.      Syncope  Etiology unclear. May have been vaso-vagal. No signs of infection. No bleeding. No obvious hypovolemia. Orthostatics negative. She did however just take a COVID booster the day prior but reports that she thought she was hydrating enough. Neurologically, she was intact. CT head negative. EKG with QTc 477, T wave abnormality but trop negative, no angina or CHF sx. Carotid duplex negative for hemodynamically significant stenosis. TTE witih normal LV size, wall thickness, wall motion and contractility. LVEF 60-65 %. Normal RV structure and function. Trace MR. Mod to severe TR. Mild p HTN. No evidence of pericardial effusion. On tele she had a somewhat lengthy paroxysm of atrial tachycardia during the midday today but may have been asymptomatic from this event.    L clavicle fracture  Appreciate input from Orthopedic Surgery. Imaging demonstrating comminuted L distal 1/3 clavicle fx. No acute orthopedic surgical intervention indicated at this time  - Analgesia prn  - NWB to LUE, in sling  - PT/OT as tolerated, seen by PT, recommended outpatient PT  - Ice as tolerated  - Outpatient follow up w/ Dr. Mckeon in the office 1/24/22I    Incidental findings of R ovarian cysts 5cm and 3cm, L ovarian cyst 1.3cm and a hyperechoic focus in endometrium  As noted on pelvic US. Differential of the endometrial finding includes a polyp, blood clot or neoplasm.   - Can be further evaluated as outpatient, will need a Gyn referral.     Dispo: discharge in stable condition    Final diagnosis, treatment plan, and follow-up recommendations were discussed and explained to the patient. The patient was given an opportunity to ask questions concerning the diagnosis and treatment plan. The patient acknowledged understanding of the diagnosis, treatment, and follow-up recommendations. The patient was advised to seek urgent care upon discharge if worsening symptoms develop prior to scheduled follow-up. Time spent on discharge included time with the patient, and also coordinating discharge care as outlined below.    Total time spent: 50 min

## 2022-01-21 NOTE — CONSULT NOTE ADULT - SUBJECTIVE AND OBJECTIVE BOX
Cardiology Consultation    HPI: R hand dominant 76 year old female w no PMHx came to ED after syncope w L shoulder pain  She received her 3rd Moderna vaccine yesterday.  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 650 milliGRAM(s) Oral every 6 hours PRN Temp greater or equal to 38C (100.4F), Mild Pain (1 - 3)  aluminum hydroxide/magnesium hydroxide/simethicone Suspension 30 milliLiter(s) Oral every 4 hours PRN Dyspepsia  melatonin 3 milliGRAM(s) Oral at bedtime PRN Insomnia      Vital Signs Last 24 Hrs  T(C): 37 (2022 19:59), Max: 37 (2022 19:59)  T(F): 98.6 (2022 19:59), Max: 98.6 (2022 19:59)  HR: 90 (2022 19:59) (90 - 90)  BP: 138/65 (2022 19:59) (138/65 - 138/65)  BP(mean): --  RR: 18 (2022 19:59) (18 - 18)  SpO2: 96% (2022 19:59) (96% - 96%)    REVIEW OF SYSTEMS:    CONSTITUTIONAL:  As per HPI. +syncope  HEENT:  Eyes:  No diplopia or blurred vision. ENT:  No earache, sore throat or runny nose.  CARDIOVASCULAR:  No pressure, squeezing, strangling, tightness, heaviness or aching about the chest, neck, axilla or epigastrium.  RESPIRATORY:  No cough, shortness of breath, PND or orthopnea.  GASTROINTESTINAL:  No nausea, vomiting or diarrhea.  GENITOURINARY:  No dysuria, frequency or urgency.  MUSCULOSKELETAL:  As per HPI.  SKIN:  No change in skin, hair or nails.  NEUROLOGIC:  No paresthesias, fasciculations, seizures or weakness.  PSYCHIATRIC:  No disorder of thought or mood.  ENDOCRINE:  No heat or cold intolerance, polyuria or polydipsia.  HEMATOLOGICAL:  No easy bruising or bleedings.     PHYSICAL EXAMINATION:    GENERAL APPEARANCE:  Pt. is not currently dyspneic, in no distress. Pt. is alert, oriented, and pleasant.  HEENT:  Pupils are normal and react normally. No icterus. Mucous membranes well colored.  NECK:  Supple. No lymphadenopathy. Jugular venous pressure not elevated. Carotids equal.   HEART:   The cardiac impulse has a normal quality. There are no murmurs, rubs or gallops noted  CHEST:  Chest is clear to auscultation. Normal respiratory effort.  ABDOMEN:  Soft and nontender.   EXTREMITIES:  There is no edema.     2022 07:01  -  2022 07:00  --------------------------------------------------------  IN: 720 mL / OUT: 0 mL / NET: 720 mL    LABS:                        12.5   5.25  )-----------( 144      ( 2022 07:12 )             38.1         142  |  110<H>  |  11  ----------------------------<  103<H>  4.0   |  29  |  0.75    Ca    8.8      2022 07:12  Mg     2.5         TPro  7.1  /  Alb  3.6  /  TBili  1.7<H>  /  DBili  x   /  AST  24  /  ALT  31  /  AlkPhos  70  -    LIVER FUNCTIONS - ( 2022 14:12 )  Alb: 3.6 g/dL / Pro: 7.1 gm/dL / ALK PHOS: 70 U/L / ALT: 31 U/L / AST: 24 U/L / GGT: x           PT/INR - ( 2022 14:12 )   PT: 12.5 sec;   INR: 1.07 ratio         PTT - ( 2022 14:12 )  PTT:27.8 sec  CARDIAC MARKERS ( 2022 14:12 )  x     / x     / 224 U/L / x     / x        Urinalysis Basic - ( 2022 16:30 )    Color: Yellow / Appearance: Clear / S.010 / pH: x  Gluc: x / Ketone: Negative  / Bili: Negative / Urobili: Negative   Blood: x / Protein: Negative / Nitrite: Negative   Leuk Esterase: Negative / RBC: 3-5 /HPF / WBC Negative   Sq Epi: x / Non Sq Epi: Occasional / Bacteria: Negative    EKG: < from: 12 Lead ECG (22 @ 13:49) >  ormal sinus rhythm  T wave abnormality, consider inferior ischemia  Prolonged QT  Abnormal ECG  No previous ECGs available    < end of copied text >  < from: US Duplex Carotid Arteries Complete, Bilateral (22 @ 11:28) >  IMPRESSION: Minimal intimal plaque without duplex evidence of   hemodynamically significant stenosis of either carotid artery.    < end of copied text >  < from: CT Abdomen and Pelvis w/ IV Cont (22 @ 15:14) >  IMPRESSION:  Comminuted fracture of the left lateral clavicle. No evidence for acute   intrathoracic or abdominal injury visualized.    5.0 cm right adnexal cyst. Recommend further evaluation with pelvic   ultrasound if this has not previously been evaluated.    < end of copied text >  < from: TTE Echo Complete w/o Contrast w/ Doppler (22 @ 08:36) >   The left ventricle is normal in size, wall thickness, wall motion and   contractility.   Estimated left ventricular ejection fraction is 60-65 %.   Normal appearing right ventricle structure and function.   Fibrocalcific changes noted to the Aortic valve leaflets with preserved   leaflet excursion.   Fibrocalcific changes noted to the mitral valve leaflets with preserved   leaflet excursion.   Trace mitral regurgitation is present.   Normal appearing tricuspid valve structure.   Moderate to severe tricuspid valve regurgitation is present.   Mild pulmonary hypertension.   No evidence of pericardialeffusion.    TELEMETRY: SR    ASSESSMENT & PLAN:
Orthopedics    Patient is a 76yFemale RHD who presents to  ED w/ a c/o of L shoulde rpain s/p syncopal fall. Patient states that she fell last night, endorses Head trauma and LOC. States ability to walk immediately following the injury. Denies any numbness or tingling. Denies having any other pain elsewhere, only left shoulder. Denies any previous orthopedic history. No other orthopedic concerns at this time.      penicillins (Hives)      PHYSICAL EXAM:  T(C): 36.6 (01-19-22 @ 13:44), Max: 36.6 (01-19-22 @ 13:44)  HR: 95 (01-19-22 @ 13:44) (95 - 95)  BP: 144/69 (01-19-22 @ 13:44) (144/69 - 144/69)  RR: 18 (01-19-22 @ 13:44) (18 - 18)  SpO2: 97% (01-19-22 @ 13:44) (97% - 97%)    Gen: NAD, Resting comfortably    LEFT Upper Extremity:   Skin intact, no skin tenting, no erythema, ecchymosis, edema, or gross deformity  TTP over the bony prominences of the clavicle  Painless passive/active ROM of the shoulder/elbow/wrist/hand/fingers  C5-T1 SILT  Motor grossly intact throughout axillary/musculocutaneous/radial/median/ulnar/AIN/PIN nerves  + radial pulse  Compartments soft and compressible    Secondary Survey:   No TTP over bony prominences, SILT, palpable pulses, full/painless A/PROM, compartments soft. No TTP over spinous processes or paraspinal muscles at C/T/L spine. No palpable step off. No other injuries or complaints.

## 2022-01-21 NOTE — DISCHARGE NOTE NURSING/CASE MANAGEMENT/SOCIAL WORK - PATIENT PORTAL LINK FT
You can access the FollowMyHealth Patient Portal offered by [facility] by registering at the following website: [email or web address]. By joining [facility]’s FollowMyHealth portal, you will also be able to view your health information using other applications (apps) compatible with our system.

## 2022-01-21 NOTE — CONSULT NOTE ADULT - ASSESSMENT
A/P: 76F with above hx p/w syncope.    1. Syncope/dizziness. Likely vasovagal. 2Decho with normal LV fxn, mod-sev TR, tr MR.  No significant carotid disease. Tele- SR with brief PSVT.  Feels well. Check orthostatics.  Trops negative. No CP.   Can have outpt monitoring, stress test with me upon d/c.  Encourage PO fluids.    2. DVT proph. D/C planning. F/u with me next week.

## 2022-01-21 NOTE — DISCHARGE NOTE PROVIDER - NSDCMRMEDTOKEN_GEN_ALL_CORE_FT
cyanocobalamin 1000 mcg oral tablet: 1 tab(s) orally once a day  Multiple Vitamins oral tablet: 1 tab(s) orally once a day  Vitamin B Complex with C oral tablet: 1 tab(s) orally once a day

## 2022-01-21 NOTE — DISCHARGE NOTE PROVIDER - PROVIDER TOKENS
PROVIDER:[TOKEN:[[id number]]],PROVIDER:[TOKEN:[[id number]]] PROVIDER:[TOKEN:[[id number]]],PROVIDER:[TOKEN:[[id number]]],PROVIDER:[TOKEN:[[id number]]]

## 2022-01-21 NOTE — DISCHARGE NOTE PROVIDER - CARE PROVIDERS DIRECT ADDRESSES
,[email or web address],[email or web address] ,[email or web address],[email or web address]

## 2022-01-25 DIAGNOSIS — N83.201 UNSPECIFIED OVARIAN CYST, RIGHT SIDE: ICD-10-CM

## 2022-01-25 DIAGNOSIS — S42.002A FRACTURE OF UNSPECIFIED PART OF LEFT CLAVICLE, INITIAL ENCOUNTER FOR CLOSED FRACTURE: ICD-10-CM
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
## 2022-01-25 DIAGNOSIS — N83.202 UNSPECIFIED OVARIAN CYST, LEFT SIDE: ICD-10-CM
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
## 2022-01-25 DIAGNOSIS — I47.1 SUPRAVENTRICULAR TACHYCARDIA: ICD-10-CM

## 2022-01-25 DIAGNOSIS — Z88.0 ALLERGY STATUS TO PENICILLIN: ICD-10-CM

## 2022-01-25 DIAGNOSIS — Z87.891 PERSONAL HISTORY OF NICOTINE DEPENDENCE: ICD-10-CM

## 2022-01-25 DIAGNOSIS — M79.602 PAIN IN LEFT ARM: ICD-10-CM

## 2022-01-25 DIAGNOSIS — S20.20XA CONTUSION OF THORAX, UNSPECIFIED, INITIAL ENCOUNTER: ICD-10-CM

## 2022-01-25 DIAGNOSIS — R55 SYNCOPE AND COLLAPSE: ICD-10-CM

## 2022-01-25 DIAGNOSIS — R07.81 PLEURODYNIA: ICD-10-CM
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
## 2022-07-25 NOTE — ED ADULT NURSE NOTE - NSFALLRSKHMRSKTYOTFT_ED_ALL_ED
"Chief Complaint   Patient presents with   • Medication Management     HPI:  Penelope is a 62-year-old established female here with concern regarding worsening anxiety and stress.  She has a difficult situation and her family in which her sister is living with her mom and is extremely dirty.  Her sister will be moving out at the beginning of August and the patient is stressed about care for her mother.  She denies any suicidal or homicidal ideations.  She has done well with Prozac in the past.  She also did excellent with Abilify but it caused extreme weight gain.  She is sleeping well with trazodone and Seroquel.  She has tried a therapist without benefit numerous times in the past and is not interested in that.  She does respond well to clonidine without any negative side effects, stating it lowers her anxiety level similar to what Xanax used to do for her.  She is on chronic opiate therapy and cannot take Xanax.      Exam:  /78 (BP Location: Left arm, Patient Position: Sitting, BP Cuff Size: Adult)   Pulse 96   Temp 36.5 °C (97.7 °F)   Resp 16   Ht 1.676 m (5' 6\")   Wt 67.6 kg (149 lb)   SpO2 95%   Gen. appears healthy in no distress   Skin appropriate for sex and age   Neck trachea is midline  Lungs unlabored breathing  Heart regular rate  Neuro gait and station normal   Psych appropriate, calm, interactive, well-groomed      A/P:    1. Stress  FLUoxetine (PROZAC) 20 MG Cap    cloNIDine (CATAPRES) 0.1 MG Tab   2. Anxiety  FLUoxetine (PROZAC) 20 MG Cap    cloNIDine (CATAPRES) 0.1 MG Tab     Reinitiated Prozac at 20 mg for the first week, then going back up to her typical dose of 40 mg.  Discussed potential side effects of restarting Prozac quickly at high dosages and she states that she does not care, she would rather get back on a fairly high dose of Prozac.  She will email me if she is having trouble with dizziness, nausea, extremely low appetite, headaches, worsening depression, anxiety.  I will see " her back here for her scheduled appointment on September 15.  She declined a referral to see a therapist.  I encouraged her to make sure that she is sharing her feelings with friends and family.  She will let me know if there is anything I can do for her prior to September 15.  Refill clonidine.  Discussed potential side effects of clonidine such as hypotension and dizziness.   syncope toda COVID vaccine yesterday